# Patient Record
Sex: MALE | Race: WHITE | URBAN - METROPOLITAN AREA
[De-identification: names, ages, dates, MRNs, and addresses within clinical notes are randomized per-mention and may not be internally consistent; named-entity substitution may affect disease eponyms.]

---

## 2022-07-28 ENCOUNTER — WEB ENCOUNTER (OUTPATIENT)
Dept: URBAN - METROPOLITAN AREA CLINIC 74 | Facility: CLINIC | Age: 37
End: 2022-07-28

## 2022-07-28 ENCOUNTER — LAB OUTSIDE AN ENCOUNTER (OUTPATIENT)
Dept: URBAN - METROPOLITAN AREA CLINIC 74 | Facility: CLINIC | Age: 37
End: 2022-07-28

## 2022-07-28 ENCOUNTER — OFFICE VISIT (OUTPATIENT)
Dept: URBAN - METROPOLITAN AREA CLINIC 74 | Facility: CLINIC | Age: 37
End: 2022-07-28
Payer: COMMERCIAL

## 2022-07-28 VITALS
OXYGEN SATURATION: 98 % | BODY MASS INDEX: 26.83 KG/M2 | SYSTOLIC BLOOD PRESSURE: 122 MMHG | HEIGHT: 68 IN | WEIGHT: 177 LBS | DIASTOLIC BLOOD PRESSURE: 82 MMHG | HEART RATE: 97 BPM | TEMPERATURE: 98.2 F

## 2022-07-28 DIAGNOSIS — R19.4 CHANGE IN BOWEL HABIT: ICD-10-CM

## 2022-07-28 DIAGNOSIS — Z83.79 FAMILY HISTORY OF OTHER DISEASES OF THE DIGESTIVE SYSTEM: ICD-10-CM

## 2022-07-28 DIAGNOSIS — K62.5 RECTAL BLEED: ICD-10-CM

## 2022-07-28 PROCEDURE — 99203 OFFICE O/P NEW LOW 30 MIN: CPT | Performed by: INTERNAL MEDICINE

## 2022-07-28 RX ORDER — POLYETHYLENE GLYCOL 3350, SODIUM SULFATE, SODIUM CHLORIDE, POTASSIUM CHLORIDE, ASCORBIC ACID, SODIUM ASCORBATE 140-9-5.2G
AS DIRECTED KIT ORAL ONCE
Qty: 1 | Refills: 0 | OUTPATIENT
Start: 2022-07-28 | End: 2022-07-29

## 2022-07-28 NOTE — HPI-TODAY'S VISIT:
Mr. Quinones is a 37-year-old white male presenting for new patient evaluation of change in bowel habits.  Patient states for the last 2 years he is experienced a change in his bowels.  His stools are smaller in size and irregular alternating between constipation and diarrhea.  In the morning he will have several loose stools up to 3 times a day.  He feels like he is incompletely evacuating.  He has noted mucus in the stool as well as foul odor to it.  He is also had occasions of bright red blood in the stool.  A year ago there was a large amount of blood.  He is not had any medical evaluation for this.  He does note that he has a family history of Crohn's disease in his mother.  His weight has been stable.  He denies any NSAID use.  He occasionally has heartburn usually in the evenings after indiscretions that he treats with Tums.

## 2022-07-29 PROBLEM — 88111009 CHANGE IN BOWEL HABIT: Status: ACTIVE | Noted: 2022-07-29

## 2022-07-29 PROBLEM — 266464001 HEMORRHAGE OF RECTUM AND ANUS: Status: ACTIVE | Noted: 2022-07-29

## 2022-08-02 LAB
A/G RATIO: 2.1
ALBUMIN: 5.1
ALKALINE PHOSPHATASE: 80
ALT (SGPT): 18
AST (SGOT): 19
ATYPICAL PANCA: (no result)
BASO (ABSOLUTE): 0.1
BASOS: 1
BILIRUBIN, TOTAL: 0.5
BUN/CREATININE RATIO: 20
BUN: 21
C-REACTIVE PROTEIN, QUANT: <1
CALCIUM: 9.3
CARBON DIOXIDE, TOTAL: 23
CHLORIDE: 98
CREATININE: 1.07
DEAMIDATED GLIADIN ABS, IGA: 3
DEAMIDATED GLIADIN ABS, IGG: 2
EGFR: 91
ENDOMYSIAL ANTIBODY IGA: NEGATIVE
EOS (ABSOLUTE): 0.1
EOS: 2
GLOBULIN, TOTAL: 2.4
GLUCOSE: 94
HEMATOCRIT: 47.3
HEMATOLOGY COMMENTS:: (no result)
HEMOGLOBIN: 15.9
IMMATURE CELLS: (no result)
IMMATURE GRANS (ABS): 0
IMMATURE GRANULOCYTES: 0
IMMUNOGLOBULIN A, QN, SERUM: 214
LYMPHS (ABSOLUTE): 2
LYMPHS: 27
MCH: 31.7
MCHC: 33.6
MCV: 94
MONOCYTES(ABSOLUTE): 0.5
MONOCYTES: 7
NEUTROPHILS (ABSOLUTE): 4.7
NEUTROPHILS: 63
NRBC: (no result)
PLATELETS: 216
POTASSIUM: 4.4
PROTEIN, TOTAL: 7.5
RBC: 5.01
RDW: 12.9
SACCHAROMYCES CEREVISIAE, IGA: <20
SACCHAROMYCES CEREVISIAE, IGG: <20
SODIUM: 138
T-TRANSGLUTAMINASE (TTG) IGA: 3
T-TRANSGLUTAMINASE (TTG) IGG: <2
WBC: 7.3

## 2022-08-05 LAB
CALPROTECTIN, STOOL - QDX: (no result)
GASTROINTESTINAL PATHOGEN: (no result)
H. PYLORI (EIA) - QDX: NEGATIVE
PANCREATICELASTASE ELISA, STOOL: (no result)
STOOL, WHITE BLOOD CELLS: (no result)

## 2022-09-07 ENCOUNTER — TELEPHONE ENCOUNTER (OUTPATIENT)
Dept: URBAN - METROPOLITAN AREA SURGERY CENTER 30 | Facility: SURGERY CENTER | Age: 37
End: 2022-09-07

## 2022-09-08 ENCOUNTER — CLAIMS CREATED FROM THE CLAIM WINDOW (OUTPATIENT)
Dept: URBAN - METROPOLITAN AREA CLINIC 4 | Facility: CLINIC | Age: 37
End: 2022-09-08
Payer: COMMERCIAL

## 2022-09-08 ENCOUNTER — OFFICE VISIT (OUTPATIENT)
Dept: URBAN - METROPOLITAN AREA SURGERY CENTER 30 | Facility: SURGERY CENTER | Age: 37
End: 2022-09-08
Payer: COMMERCIAL

## 2022-09-08 DIAGNOSIS — K52.89 OTHER SPECIFIED NONINFECTIVE GASTROENTERITIS AND COLITIS: ICD-10-CM

## 2022-09-08 DIAGNOSIS — K63.89 OTHER SPECIFIED DISEASES OF INTESTINE: ICD-10-CM

## 2022-09-08 DIAGNOSIS — K63.89 BACTERIAL OVERGROWTH SYNDROME: ICD-10-CM

## 2022-09-08 DIAGNOSIS — K62.5 ANAL BLEEDING: ICD-10-CM

## 2022-09-08 DIAGNOSIS — Z83.79 FAMILY HISTORY OF OTHER DISEASES OF THE DIGESTIVE SYSTEM: ICD-10-CM

## 2022-09-08 PROCEDURE — 88305 TISSUE EXAM BY PATHOLOGIST: CPT | Performed by: PATHOLOGY

## 2022-09-08 PROCEDURE — 45380 COLONOSCOPY AND BIOPSY: CPT | Performed by: INTERNAL MEDICINE

## 2022-09-08 PROCEDURE — G8907 PT DOC NO EVENTS ON DISCHARG: HCPCS | Performed by: INTERNAL MEDICINE

## 2022-09-21 ENCOUNTER — TELEPHONE ENCOUNTER (OUTPATIENT)
Dept: URBAN - METROPOLITAN AREA CLINIC 40 | Facility: CLINIC | Age: 37
End: 2022-09-21

## 2022-09-21 RX ORDER — PREDNISONE 20 MG/1
2 TABLETS TABLET ORAL ONCE A DAY
Qty: 60 TABLET | Refills: 0 | OUTPATIENT
Start: 2022-09-22 | End: 2022-10-22

## 2022-09-23 ENCOUNTER — OFFICE VISIT (OUTPATIENT)
Dept: URBAN - METROPOLITAN AREA CLINIC 74 | Facility: CLINIC | Age: 37
End: 2022-09-23

## 2022-09-26 PROBLEM — 160381001 FH: GASTROINTESTINAL DISEASE: Status: ACTIVE | Noted: 2022-07-29

## 2022-10-07 ENCOUNTER — LAB OUTSIDE AN ENCOUNTER (OUTPATIENT)
Dept: URBAN - METROPOLITAN AREA CLINIC 74 | Facility: CLINIC | Age: 37
End: 2022-10-07

## 2022-10-07 ENCOUNTER — OFFICE VISIT (OUTPATIENT)
Dept: URBAN - METROPOLITAN AREA CLINIC 74 | Facility: CLINIC | Age: 37
End: 2022-10-07
Payer: COMMERCIAL

## 2022-10-07 VITALS
HEIGHT: 68 IN | BODY MASS INDEX: 26.22 KG/M2 | WEIGHT: 173 LBS | DIASTOLIC BLOOD PRESSURE: 90 MMHG | HEART RATE: 74 BPM | SYSTOLIC BLOOD PRESSURE: 140 MMHG | OXYGEN SATURATION: 97 % | TEMPERATURE: 97.1 F

## 2022-10-07 DIAGNOSIS — K50.00 CROHN'S DISEASE INVOLVING TERMINAL ILEUM: ICD-10-CM

## 2022-10-07 PROCEDURE — 99214 OFFICE O/P EST MOD 30 MIN: CPT | Performed by: INTERNAL MEDICINE

## 2022-10-07 RX ORDER — MESALAMINE 1.2 G/1
4 TABLETS TABLET, DELAYED RELEASE ORAL ONCE A DAY
Qty: 120 TABLET | Refills: 0 | OUTPATIENT
Start: 2022-10-07 | End: 2022-11-06

## 2022-10-07 RX ORDER — PREDNISONE 20 MG/1
2 TABLETS TABLET ORAL ONCE A DAY
Qty: 60 TABLET | Refills: 0 | Status: ACTIVE | COMMUNITY
Start: 2022-09-22 | End: 2022-10-22

## 2022-10-07 NOTE — HPI-TODAY'S VISIT:
Mr. Quinones presents to clinic for follow-up.  He was initially seen in July complaining of change in his bowels and rectal bleeding.  He also had a family history of inflammatory bowel disease.  We started his work-up with stools and blood work.  His IBD serology was negative but his stools showed a borderline elevated calprotectin.  We therefore did a colonoscopy on September 8.  This was complete to the ileum.  The ileum did show active ileitis.  Colon mucosa was normal throughout the entire examined colon.  Biopsies of the small bowel were consistent with Crohn's ileitis.  Biopsies of his colon were negative.  He was started on prednisone and has been taking this for a little over a week.  He is getting a little bit of a side effect with dry mouth.  He does note some improvement with his bowels.  He is having 2 bowel movements a day.  They are soft.  No bleeding.  He denies any abdominal discomfort or nausea.

## 2022-12-01 ENCOUNTER — OFFICE VISIT (OUTPATIENT)
Dept: URBAN - METROPOLITAN AREA CLINIC 74 | Facility: CLINIC | Age: 37
End: 2022-12-01
Payer: COMMERCIAL

## 2022-12-01 ENCOUNTER — DASHBOARD ENCOUNTERS (OUTPATIENT)
Age: 37
End: 2022-12-01

## 2022-12-01 VITALS
HEIGHT: 68 IN | DIASTOLIC BLOOD PRESSURE: 78 MMHG | TEMPERATURE: 97.9 F | BODY MASS INDEX: 27.83 KG/M2 | HEART RATE: 76 BPM | WEIGHT: 183.6 LBS | SYSTOLIC BLOOD PRESSURE: 124 MMHG | OXYGEN SATURATION: 98 %

## 2022-12-01 DIAGNOSIS — K50.00 CROHN'S DISEASE INVOLVING TERMINAL ILEUM: ICD-10-CM

## 2022-12-01 PROBLEM — 56689002 CROHN'S DISEASE OF SMALL INTESTINE: Status: ACTIVE | Noted: 2022-10-07

## 2022-12-01 PROCEDURE — 99214 OFFICE O/P EST MOD 30 MIN: CPT | Performed by: INTERNAL MEDICINE

## 2022-12-01 RX ORDER — MESALAMINE 1.2 G/1
4 TABLETS TABLET, DELAYED RELEASE ORAL ONCE A DAY
Qty: 120 TABLET | Refills: 0
Start: 2022-10-07 | End: 2022-12-31

## 2022-12-01 NOTE — HPI-TODAY'S VISIT:
Mr. Quinones presents to clinic for follow-up.  He was last seen on October 7.  Patient was started on mesalamine for his small bowel Crohn's after colonoscopy was significant for ileitis on pathology as well as endoscopic evaluation.  We got a CT enterography in the meantime.  This showed mild mucosal thickening in the distal 4 cm of the terminal ileum.  No other bowel disease was noted in the rest of the small bowel or large intestine.  Patient states he did a month of the mesalamine.  He ran out of the medication.  Has been off the prednisone that we started at the time of his colonoscopy.  He is currently having 1-3 bowel movements daily depending on what he eats.  He denies any blood in the stool.  Denies any abdominal pain.  Denies any nausea.

## 2022-12-01 NOTE — PHYSICAL EXAM GASTROINTESTINAL
Abdomen , soft, mild RLQ tenderness, nondistended , no guarding or rigidity , no masses palpable , normal bowel sounds , Liver and Spleen , no hepatomegaly present , no hepatosplenomegaly , liver nontender , spleen not palpable

## 2023-01-19 ENCOUNTER — OFFICE VISIT (OUTPATIENT)
Dept: URBAN - METROPOLITAN AREA CLINIC 74 | Facility: CLINIC | Age: 38
End: 2023-01-19